# Patient Record
Sex: FEMALE | Race: OTHER | Employment: OTHER | URBAN - METROPOLITAN AREA
[De-identification: names, ages, dates, MRNs, and addresses within clinical notes are randomized per-mention and may not be internally consistent; named-entity substitution may affect disease eponyms.]

---

## 2020-02-13 PROBLEM — J33.9 NASAL POLYPS: Status: ACTIVE | Noted: 2020-02-13

## 2020-02-13 PROBLEM — H26.9 CATARACT: Status: ACTIVE | Noted: 2020-02-13

## 2020-02-13 PROBLEM — H04.123 DRY EYES: Status: ACTIVE | Noted: 2020-02-13

## 2020-02-13 PROBLEM — M35.00 SJOGREN'S DISEASE (HCC): Status: ACTIVE | Noted: 2020-02-13

## 2020-02-13 PROBLEM — J44.9 COPD (CHRONIC OBSTRUCTIVE PULMONARY DISEASE) (HCC): Status: ACTIVE | Noted: 2020-02-13

## 2020-02-28 ENCOUNTER — HOSPITAL ENCOUNTER (OUTPATIENT)
Dept: CT IMAGING | Age: 35
Discharge: HOME | End: 2020-02-28
Attending: INTERNAL MEDICINE
Payer: COMMERCIAL

## 2020-02-28 DIAGNOSIS — J44.9 CHRONIC OBSTRUCTIVE PULMONARY DISEASE, UNSPECIFIED COPD TYPE (HCC): ICD-10-CM

## 2020-02-28 PROCEDURE — 71250 CT THORAX DX C-: CPT

## 2020-10-27 ENCOUNTER — HOSPITAL ENCOUNTER (OUTPATIENT)
Dept: LAB | Age: 35
Discharge: HOME | End: 2020-10-27
Payer: COMMERCIAL

## 2020-10-27 PROCEDURE — 36415 COLL VENOUS BLD VENIPUNCTURE: CPT

## 2020-10-27 PROCEDURE — 82103 ALPHA-1-ANTITRYPSIN TOTAL: CPT

## 2020-10-29 LAB — A1AT SERPL-MCNC: 115 MG/DL (ref 100–188)

## 2021-05-14 ENCOUNTER — HOSPITAL ENCOUNTER (OUTPATIENT)
Dept: LAB | Age: 36
Discharge: HOME | End: 2021-05-14
Payer: COMMERCIAL

## 2021-05-14 DIAGNOSIS — J44.9 STAGE 3 SEVERE COPD BY GOLD CLASSIFICATION (HCC): ICD-10-CM

## 2021-05-14 PROCEDURE — 82103 ALPHA-1-ANTITRYPSIN TOTAL: CPT

## 2021-05-19 LAB
A1AT PHENOTYP SERPL IFE: NORMAL
A1AT SERPL-MCNC: 115 MG/DL (ref 100–188)

## 2021-12-04 ENCOUNTER — APPOINTMENT (OUTPATIENT)
Dept: GENERAL RADIOLOGY | Age: 36
End: 2021-12-04
Attending: EMERGENCY MEDICINE
Payer: COMMERCIAL

## 2021-12-04 ENCOUNTER — HOSPITAL ENCOUNTER (EMERGENCY)
Age: 36
Discharge: HOME | End: 2021-12-04
Attending: EMERGENCY MEDICINE
Payer: COMMERCIAL

## 2021-12-04 VITALS
HEIGHT: 66 IN | HEART RATE: 69 BPM | BODY MASS INDEX: 19.29 KG/M2 | OXYGEN SATURATION: 97 % | RESPIRATION RATE: 18 BRPM | DIASTOLIC BLOOD PRESSURE: 71 MMHG | TEMPERATURE: 98.8 F | WEIGHT: 120 LBS | SYSTOLIC BLOOD PRESSURE: 136 MMHG

## 2021-12-04 DIAGNOSIS — J44.9 CHRONIC OBSTRUCTIVE PULMONARY DISEASE, UNSPECIFIED COPD TYPE (HCC): ICD-10-CM

## 2021-12-04 DIAGNOSIS — R91.8 LEFT LOWER LOBE PULMONARY INFILTRATE: Primary | ICD-10-CM

## 2021-12-04 LAB
ALBUMIN SERPL-MCNC: 2.8 G/DL (ref 3.5–5)
ALBUMIN/GLOB SERPL: 0.5 {RATIO} (ref 1.2–3.5)
ALP SERPL-CCNC: 85 U/L (ref 50–136)
ALT SERPL-CCNC: 27 U/L (ref 12–65)
ANION GAP SERPL CALC-SCNC: 5 MMOL/L (ref 7–16)
AST SERPL-CCNC: 31 U/L (ref 15–37)
BASOPHILS # BLD: 0 K/UL (ref 0–0.2)
BASOPHILS NFR BLD: 0 % (ref 0–2)
BILIRUB SERPL-MCNC: 0.2 MG/DL (ref 0.2–1.1)
BUN SERPL-MCNC: 19 MG/DL (ref 6–23)
CALCIUM SERPL-MCNC: 9.1 MG/DL (ref 8.3–10.4)
CHLORIDE SERPL-SCNC: 107 MMOL/L (ref 98–107)
CO2 SERPL-SCNC: 25 MMOL/L (ref 21–32)
CREAT SERPL-MCNC: 0.7 MG/DL (ref 0.6–1)
DIFFERENTIAL METHOD BLD: ABNORMAL
EOSINOPHIL # BLD: 0 K/UL (ref 0–0.8)
EOSINOPHIL NFR BLD: 0 % (ref 0.5–7.8)
ERYTHROCYTE [DISTWIDTH] IN BLOOD BY AUTOMATED COUNT: 12.6 % (ref 11.9–14.6)
GLOBULIN SER CALC-MCNC: 5.7 G/DL (ref 2.3–3.5)
GLUCOSE SERPL-MCNC: 91 MG/DL (ref 65–100)
HCT VFR BLD AUTO: 36.3 % (ref 35.8–46.3)
HGB BLD-MCNC: 11.9 G/DL (ref 11.7–15.4)
IMM GRANULOCYTES # BLD AUTO: 0 K/UL (ref 0–0.5)
IMM GRANULOCYTES NFR BLD AUTO: 0 % (ref 0–5)
LYMPHOCYTES # BLD: 2.4 K/UL (ref 0.5–4.6)
LYMPHOCYTES NFR BLD: 27 % (ref 13–44)
MAGNESIUM SERPL-MCNC: 2 MG/DL (ref 1.8–2.4)
MCH RBC QN AUTO: 29.3 PG (ref 26.1–32.9)
MCHC RBC AUTO-ENTMCNC: 32.8 G/DL (ref 31.4–35)
MCV RBC AUTO: 89.4 FL (ref 79.6–97.8)
MONOCYTES # BLD: 0.7 K/UL (ref 0.1–1.3)
MONOCYTES NFR BLD: 8 % (ref 4–12)
NEUTS SEG # BLD: 5.9 K/UL (ref 1.7–8.2)
NEUTS SEG NFR BLD: 65 % (ref 43–78)
NRBC # BLD: 0 K/UL (ref 0–0.2)
PLATELET # BLD AUTO: 348 K/UL (ref 150–450)
PMV BLD AUTO: 9.3 FL (ref 9.4–12.3)
POTASSIUM SERPL-SCNC: 4.6 MMOL/L (ref 3.5–5.1)
PROT SERPL-MCNC: 8.5 G/DL (ref 6.3–8.2)
RBC # BLD AUTO: 4.06 M/UL (ref 4.05–5.2)
SODIUM SERPL-SCNC: 137 MMOL/L (ref 136–145)
WBC # BLD AUTO: 9 K/UL (ref 4.3–11.1)

## 2021-12-04 PROCEDURE — 80053 COMPREHEN METABOLIC PANEL: CPT

## 2021-12-04 PROCEDURE — 71045 X-RAY EXAM CHEST 1 VIEW: CPT

## 2021-12-04 PROCEDURE — 99283 EMERGENCY DEPT VISIT LOW MDM: CPT

## 2021-12-04 PROCEDURE — 83735 ASSAY OF MAGNESIUM: CPT

## 2021-12-04 PROCEDURE — 93005 ELECTROCARDIOGRAM TRACING: CPT | Performed by: EMERGENCY MEDICINE

## 2021-12-04 PROCEDURE — 85025 COMPLETE CBC W/AUTO DIFF WBC: CPT

## 2021-12-04 RX ORDER — SODIUM CHLORIDE 0.9 % (FLUSH) 0.9 %
5-10 SYRINGE (ML) INJECTION AS NEEDED
Status: DISCONTINUED | OUTPATIENT
Start: 2021-12-04 | End: 2021-12-04 | Stop reason: HOSPADM

## 2021-12-04 RX ORDER — DOXYCYCLINE 100 MG/1
100 CAPSULE ORAL 2 TIMES DAILY
Qty: 14 CAPSULE | Refills: 0 | Status: SHIPPED | OUTPATIENT
Start: 2021-12-04 | End: 2021-12-11

## 2021-12-04 RX ORDER — SODIUM CHLORIDE 0.9 % (FLUSH) 0.9 %
5-10 SYRINGE (ML) INJECTION EVERY 8 HOURS
Status: DISCONTINUED | OUTPATIENT
Start: 2021-12-04 | End: 2021-12-04 | Stop reason: HOSPADM

## 2021-12-04 NOTE — ED NOTES
I have reviewed discharge instructions with the patient. The patient verbalized understanding. Patient left ED via Discharge Method: ambulatory to Home    Opportunity for questions and clarification provided. Patient given 1 scripts. To continue your aftercare when you leave the hospital, you may receive an automated call from our care team to check in on how you are doing.  This is a free service and part of our promise to provide the best care and service to meet your aftercare needs. \" If you have questions, or wish to unsubscribe from this service please call [phone].  Thank you for Choosing our [facility].

## 2021-12-04 NOTE — ED TRIAGE NOTES
Patient ambulatory to triage with mask in place. Patient reports cough and shob that started Tuesday. Pt reports pain with inspiration. Pt reports sore throat, chills, decreased appetite, decreased taste and smell.

## 2021-12-04 NOTE — ED PROVIDER NOTES
Patient with history of COPD and some pulmonary scarring by her history. She is here with a cough that has been present over 1 week more recently has onset of greenish sputum. Not a present smoker. States that sputum is moderate quantity and greenish in color she has had some chills as well. Some overall decreased appetite along with her illness. She had a Covid test done on Monday that was negative. She has had both the vaccine and booster. Other than her COPD her main other issues are Sjogren's syndrome. She has some scarring on her left lung due to a left lower lobe pulmonary nodule. The history is provided by the patient. Cough  This is a new problem. The current episode started more than 2 days ago. The problem occurs constantly. The problem has been gradually worsening. The cough is productive of sputum. There has been no fever. Pertinent negatives include no chest pain, no chills and no shortness of breath. She has tried nothing for the symptoms. She is not a smoker. Her past medical history is significant for COPD. Her past medical history does not include cancer or CHF.         Past Medical History:   Diagnosis Date    Cataracts, bilateral     Celiac disease     COPD (chronic obstructive pulmonary disease) (HCC)     Nasal polyps     Pulmonary nodules     Sjogren's disease ([facility] [address]        Past Surgical History:   Procedure Laterality Date    HX THORACOTOMY Left 2017    Resection of left lower lobe lung nodule         Family History:   Problem Relation Age of Onset    Lupus Other     Breast Cancer Other     Colon Cancer Neg Hx        Social History     Socioeconomic History    Marital status:      Spouse name: Not on file    Number of children: Not on file    Years of education: Not on file    Highest education level: Not on file   Occupational History    Not on file   Tobacco Use    Smoking status: Former Smoker     Packs/day: 0.50     Years: 10.00     Pack years: 5.00    Smokeless tobacco: Never Used    Tobacco comment: exposure to a lot of second hand smoke. Substance and Sexual Activity    Alcohol use: Not Currently    Drug use: Never    Sexual activity: Not on file   Other Topics Concern    Not on file   Social History Narrative    Not on file     Social Determinants of Health     Financial Resource Strain:     Difficulty of Paying Living Expenses: Not on file   Food Insecurity:     Worried About Running Out of Food in the Last Year: Not on file    Lucy of Food in the Last Year: Not on file   Transportation Needs:     Lack of Transportation (Medical): Not on file    Lack of Transportation (Non-Medical): Not on file   Physical Activity:     Days of Exercise per Week: Not on file    Minutes of Exercise per Session: Not on file   Stress:     Feeling of Stress : Not on file   Social Connections:     Frequency of Communication with Friends and Family: Not on file    Frequency of Social Gatherings with Friends and Family: Not on file    Attends [employer] Services: Not on file    Active Member of [address] [place] [address] or Organizations: Not on file    Attends Club or Organization Meetings: Not on file    Marital Status: Not on file   Intimate Partner Violence:     Fear of Current or Ex-Partner: Not on file    Emotionally Abused: Not on file    Physically Abused: Not on file    Sexually Abused: Not on file   Housing Stability:     Unable to Pay for Housing in the Last Year: Not on file    Number of [place] in the Last Year: Not on file    Unstable Housing in the Last Year: Not on file         ALLERGIES: Patient has no known allergies. Review of Systems   Constitutional: Negative. Negative for chills and fever. HENT: Negative. Respiratory: Positive for cough. Negative for shortness of breath. Cardiovascular: Negative. Negative for chest pain and leg swelling. Endocrine: Negative. Musculoskeletal: Negative for neck pain.    Skin: Negative for color change and pallor. Neurological: Negative. Psychiatric/Behavioral: Negative. Negative for behavioral problems. All other systems reviewed and are negative. Vitals:    12/04/21 1235 12/04/21 1617   BP: 136/71    Pulse: 80 67   Resp: 20 17   Temp: 98.8 °F (37.1 °C)    SpO2: 97%    Weight: 54.4 kg (120 lb)    Height: 5' 6\" (1.676 m)             Physical Exam  Vitals and nursing note reviewed. Constitutional:       General: She is not in acute distress. Appearance: She is well-developed. HENT:      Head: Atraumatic. Right Ear: External ear normal.      Left Ear: External ear normal.      Mouth/Throat:      Mouth: Mucous membranes are moist.      Pharynx: No oropharyngeal exudate. Comments: Slight white patchiness to tongue which patient states is chronic and related to her Sjogren's syndrome  Eyes:      General: No scleral icterus. Cardiovascular:      Rate and Rhythm: Normal rate. Pulmonary:      Effort: Pulmonary effort is normal. No respiratory distress. Comments: Rare end expiratory wheeze  Abdominal:      General: Abdomen is flat. Palpations: Abdomen is soft. Tenderness: There is no right CVA tenderness or left CVA tenderness. Musculoskeletal:      Cervical back: Neck supple. Right lower leg: No edema. Left lower leg: No edema. Skin:     General: Skin is warm and dry. Neurological:      Mental Status: Mental status is at baseline. Psychiatric:         Thought Content: Thought content normal.          MDM  Number of Diagnoses or Management Options  Diagnosis management comments: Here with negative Covid swab earlier this week persistence of cough and now the onset of some greenish sputum and patient with some pulmonary fibrosis prior lung nodule resection and COPD diagnosis. In light of this we will put her on antibiotics she is a patient of [facility] and can follow with them with any worsening or persistence. Do not think this is Covid. Does have patchy airspace infiltrate proximally adjacent to an old scar in the left lower lung. Amount and/or Complexity of Data Reviewed  Clinical lab tests: ordered and reviewed  Tests in the radiology section of CPT®: reviewed and ordered  Decide to obtain previous medical records or to obtain history from someone other than the patient: yes  Independent visualization of images, tracings, or specimens: yes    Risk of Complications, Morbidity, and/or Mortality  Presenting problems: moderate  Diagnostic procedures: moderate  Management options: moderate  General comments:  To return at any point with worsening    Patient Progress  Patient progress: stable         Procedures

## 2022-03-19 PROBLEM — M35.00 SJOGREN'S DISEASE (HCC): Status: ACTIVE | Noted: 2020-02-13

## 2022-03-19 PROBLEM — H04.123 DRY EYES: Status: ACTIVE | Noted: 2020-02-13

## 2022-03-19 PROBLEM — H26.9 CATARACT: Status: ACTIVE | Noted: 2020-02-13

## 2022-03-20 PROBLEM — J33.9 NASAL POLYPS: Status: ACTIVE | Noted: 2020-02-13

## 2022-03-20 PROBLEM — J44.9 COPD (CHRONIC OBSTRUCTIVE PULMONARY DISEASE) (HCC): Status: ACTIVE | Noted: 2020-02-13

## 2022-11-16 ENCOUNTER — OFFICE VISIT (OUTPATIENT)
Dept: PULMONOLOGY | Age: 37
End: 2022-11-16
Payer: COMMERCIAL

## 2022-11-16 VITALS
HEART RATE: 63 BPM | RESPIRATION RATE: 20 BRPM | DIASTOLIC BLOOD PRESSURE: 80 MMHG | SYSTOLIC BLOOD PRESSURE: 120 MMHG | WEIGHT: 127 LBS | OXYGEN SATURATION: 96 % | TEMPERATURE: 98 F | HEIGHT: 67 IN | BODY MASS INDEX: 19.93 KG/M2

## 2022-11-16 DIAGNOSIS — J98.4 MULTIPLE IDIOPATHIC CYSTS OF LUNG: ICD-10-CM

## 2022-11-16 DIAGNOSIS — J41.0 SIMPLE CHRONIC BRONCHITIS (HCC): Primary | ICD-10-CM

## 2022-11-16 DIAGNOSIS — R06.09 DYSPNEA ON EXERTION: ICD-10-CM

## 2022-11-16 DIAGNOSIS — M35.00 SJOGREN SYNDROME, UNSPECIFIED (HCC): ICD-10-CM

## 2022-11-16 DIAGNOSIS — R93.89 ABNORMAL CHEST CT: ICD-10-CM

## 2022-11-16 DIAGNOSIS — I73.00 RAYNAUD'S PHENOMENON WITHOUT GANGRENE: ICD-10-CM

## 2022-11-16 PROCEDURE — 99214 OFFICE O/P EST MOD 30 MIN: CPT | Performed by: INTERNAL MEDICINE

## 2022-11-16 RX ORDER — ALBUTEROL SULFATE 90 UG/1
2 AEROSOL, METERED RESPIRATORY (INHALATION) EVERY 6 HOURS PRN
Qty: 1 EACH | Refills: 11 | Status: SHIPPED | OUTPATIENT
Start: 2022-11-16

## 2022-11-16 NOTE — PROGRESS NOTES
[address] [facility] [address], [address] [facility]  [phone]    Patient Name:  Hina Bonner  YOB: 1985      Office Visit 11/16/2022    CHIEF COMPLAINT:    Chief Complaint   Patient presents with    COPD         HISTORY OF PRESENT ILLNESS:     Previously seen by:  Pedrito Kern MD  on 05/10/22   Pt is a 39 y.o. female with a history of COPD, prior tobacco abuse, Sjogren's disease, and lung nodules. Pt had CPFTs on 6/1/2020 which revealed severe obstruction and FVC with  negative bronchodilator response with moderate air trapping. Her DLCO was also reduced consistent with GOLD stage III COPD. Pt is seen today for follow up. She had Alpha 1 testing with a value of 115, which is low normal. Pt reports that she is doing well overall. She reports that her allergies are bothering her more than anything. She is out of singulair and needs to get back on it. She is using breo daily. She does think that it helps. She uses albuterol hfa about once every other day. She reports occasional wheezing. She reports occasional cough with yellow sputum production. Typically, her cough is dry. She denies fever or chills. She does have night sweats at times. ASSESSMENT:               ICD-10-CM  ICD-9-CM             1.  Stage 3 severe COPD by GOLD classification ([address] -FEV1 relatively stable no exacerbations in the interval, continue breo and albuterol. Breo sample  provided. Continue singulair. Continue zyrtec. Return to the office for follow-up in 6 months, call the office sooner should worsening symptoms occur prior to that time  J44.9  496   Breo, albuterol refills, Breo samples given           2. Sjogren's syndrome, with unspecified organ involvement ([address] -continue pilocarpine. Check spirometry at follow up. May need to consider annual CPFTs and  echocardiograms to evaluate for [facility] given Sjogren's syndrome.    M35.00  710.2  pilocarpine (SALAGEN) 5 mg tablet     2022:    The patient has done well since her last visit, she has had no exacerbations of COPD or Sjogren's syndrome, her symptoms are under control, she is not limited in exertional capacity. Recently she has been diagnosed with strep throat and is currently taking Augmentin 875 mg twice daily for 10 days. She has no worsening shortness of breath, wheezing. She continues to have dry eyes for which she uses drops and dry mouth. Past Medical History:   Diagnosis Date    Cataracts, bilateral     Celiac disease     COPD (chronic obstructive pulmonary disease) ([facility])     Nasal polyps     Pulmonary nodules     Sjogren's disease ([facility]          Patient Active Problem List   Diagnosis    Sjogren's disease ([facility]    Dry eyes    Cataract    COPD (chronic obstructive pulmonary disease) ([facility]    Nasal polyps           Past Surgical History:   Procedure Laterality Date    THORACOTOMY Left 2017    Resection of left lower lobe lung nodule         Social History     Socioeconomic History    Marital status:      Spouse name: Not on file    Number of children: Not on file    Years of education: Not on file    Highest education level: Not on file   Occupational History    Not on file   Tobacco Use    Smoking status: Former     Packs/day: 0.50     Types: Cigarettes     Quit date: 2018     Years since quittin.8    Smokeless tobacco: Never    Tobacco comments:     Quit smoking: exposure to a lot of second hand smoke.    Substance and Sexual Activity    Alcohol use: Not Currently    Drug use: Never    Sexual activity: Not on file   Other Topics Concern    Not on file   Social History Narrative    Not on file     Social Determinants of Health     Financial Resource Strain: Not on file   Food Insecurity: Not on file   Transportation Needs: Not on file   Physical Activity: Not on file   Stress: Not on file   Social Connections: Not on file   Intimate Partner Violence: Not on file   Housing Stability: Not on file         Family History   Problem Relation Age of Onset    Colon Cancer Neg Hx     Breast Cancer Other     Lupus Other          No Known Allergies      Current Outpatient Medications   Medication Sig    Fluticasone furoate-vilanterol (BREO ELLIPTA) 200-25 MCG/INH AEPB inhaler Inhale 1 puff into the lungs daily Inhale 1 puff by mouth once daily    albuterol sulfate HFA (PROVENTIL;VENTOLIN;PROAIR) 108 (90 Base) MCG/ACT inhaler Inhale 2 puffs into the lungs every 6 hours as needed for Shortness of Breath    cetirizine (ZYRTEC) 10 MG tablet Take by mouth    fluticasone (FLONASE) 50 MCG/ACT nasal spray Use 2 spray(s) in each nostril once daily    montelukast (SINGULAIR) 10 MG tablet Take 10 mg by mouth daily    pilocarpine (SALAGEN) 5 MG tablet Take 5 mg by mouth 3 times daily     No current facility-administered medications for this visit. Review of Systems        PHYSICAL EXAM:    Vitals:    11/16/22 0853   BP: 120/80   Pulse: 63   Resp: 20   Temp: 98 °F (36.7 °C)   SpO2: 96%        GENERAL APPEARANCE:   The patient is normal weight and in no respiratory distress. HEENT:   PERRL. Conjunctivae unremarkable. Nasal mucosa is without epistaxis, exudate, or polyps. Gums and dentition are unremarkable. There is no oropharyngeal narrowing. TMs are clear. NECK/LYMPHATIC:   Symmetrical with no elevation of jugular venous pulsation. Trachea midline. No thyroid enlargement. No cervical adenopathy. LUNGS:   Normal respiratory effort with symmetrical lung expansion. Breath sounds clear to auscultation bilaterally with occasional end expiratory wheeze. HEART:   There is a regular rate and rhythm. No murmur, rub, or gallop. There is no edema in the lower extremities. ABDOMEN:   Soft and non-tender. No hepatosplenomegaly. Bowel sounds are normal.     NEURO:   The patient is alert and oriented to person, place, and time.   Memory appears intact and mood is normal.  No gross sensorimotor deficits are present. DIAGNOSTIC TESTS:       ASSESSMENT:  (Medical Decision Making)     Patient Active Problem List   Diagnosis    Sjogren's disease ([facility] [address]    Dry eyes    Cataract    COPD (chronic obstructive pulmonary disease) ([facility] [address]    Nasal polyps           PLAN:  Encounter Diagnoses   Name Primary? 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 She is asymptomatic currently, she has her Sjogren's syndrome well controlled, she does have Raynaud's phenomenon and tries to avoid cold weather and protect herself from the cold, she does have some dyspnea on exertion occasionally but overall being on Breo and albuterol her symptoms are very well controlled. I would like to perform complete pulmonary function testing for baseline currently and have the patient come back to the office for follow-up in 6 months with repeat complete pulmonary function testing for comparison. If her symptoms are worse and her pulmonary function is worse within the 6-month period, repeat imaging will be performed and if abnormal bronchoscopy with BAL will be performed to confirm lymphocytic inflammation which I am almost certain is present. Following this we may need to treat the patient more aggressively for interstitial lung disease related to sicca syndrome. I explained this to the patient, any questions asked were answered seemingly to her satisfaction, she is due to travel to [facility]) for a few months and will be back in 6 months in time for her follow-up.     No orders of the defined types were placed in this encounter. Total time spent 30 minutes    Orders Placed This Encounter   Medications    Fluticasone furoate-vilanterol (BREO ELLIPTA) 200-25 MCG/INH AEPB inhaler     Sig: Inhale 1 puff into the lungs daily Inhale 1 puff by mouth once daily     Dispense:  1 each     Refill:  11    albuterol sulfate HFA (PROVENTIL;VENTOLIN;PROAIR) 108 (90 Base) MCG/ACT inhaler     Sig: Inhale 2 puffs into the lungs every 6 hours as needed for Shortness of Breath     Dispense:  1 each     Refill:  [address] Allie MD  Dictated using voice recognition software.  Proofread, but unrecognized voice recognition errors may exist.

## 2024-02-22 ENCOUNTER — TELEPHONE (OUTPATIENT)
Dept: PULMONOLOGY | Age: 39
End: 2024-02-22